# Patient Record
Sex: FEMALE | Race: OTHER
[De-identification: names, ages, dates, MRNs, and addresses within clinical notes are randomized per-mention and may not be internally consistent; named-entity substitution may affect disease eponyms.]

---

## 2022-05-12 ENCOUNTER — APPOINTMENT (OUTPATIENT)
Dept: ORTHOPEDIC SURGERY | Facility: CLINIC | Age: 67
End: 2022-05-12
Payer: MEDICARE

## 2022-05-12 VITALS — HEIGHT: 68 IN | BODY MASS INDEX: 23.49 KG/M2 | WEIGHT: 155 LBS

## 2022-05-12 DIAGNOSIS — M76.821 POSTERIOR TIBIAL TENDINITIS, RIGHT LEG: ICD-10-CM

## 2022-05-12 PROBLEM — Z00.00 ENCOUNTER FOR PREVENTIVE HEALTH EXAMINATION: Status: ACTIVE | Noted: 2022-05-12

## 2022-05-12 PROCEDURE — 99213 OFFICE O/P EST LOW 20 MIN: CPT

## 2022-05-12 NOTE — PHYSICAL EXAM
[Right] : right foot and ankle [NL (40)] : plantar flexion 40 degrees [NL 30)] : inversion 30 degrees [NL (20)] : eversion 20 degrees [5___] : plantar flexion 5[unfilled]/5 [2+] : dorsalis pedis pulse: 2+ [] : Sensation present to light touch in all distributions

## 2022-05-12 NOTE — HISTORY OF PRESENT ILLNESS
[2] : 2 [1] : 2 [Dull/Aching] : dull/aching [Localized] : localized [Rest] : rest [Walking] : walking [de-identified] : 12/9/21: R foot/ankle pain worsen over the last few weeks. Pain is medial and shoots up the leg. Tried Tylenol without relief. [place] since covid booster on 11/19\par \par 12/16/21: f/u R foot/ankle to review the MRI\par 3/17/22: f/u R ankle, brace/PT with 80% improvement, working on balance.\par \par 5/12/22: F/u R ankle, reports significant improvement with 10 session of PT and has transitioned to HEP. No longer using the brace. [] : Post Surgical Visit: no [FreeTextEntry1] : R ankle